# Patient Record
Sex: FEMALE | URBAN - METROPOLITAN AREA
[De-identification: names, ages, dates, MRNs, and addresses within clinical notes are randomized per-mention and may not be internally consistent; named-entity substitution may affect disease eponyms.]

---

## 2022-03-28 ENCOUNTER — OFFICE VISIT (OUTPATIENT)
Dept: URBAN - METROPOLITAN AREA CLINIC 6 | Facility: CLINIC | Age: 77
End: 2022-03-28
Payer: MEDICARE

## 2022-03-28 DIAGNOSIS — E11.9 DIABETES MELLITUS TYPE 2 WITHOUT MENTION OF COMPLICATION: Primary | ICD-10-CM

## 2022-03-28 DIAGNOSIS — H25.813 COMBINED FORMS OF AGE-RELATED CATARACT, BILATERAL: ICD-10-CM

## 2022-03-28 DIAGNOSIS — G24.5 BLEPHAROSPASM: ICD-10-CM

## 2022-03-28 DIAGNOSIS — H43.813 VITREOUS DEGENERATION, BILATERAL: ICD-10-CM

## 2022-03-28 PROCEDURE — 92004 COMPRE OPH EXAM NEW PT 1/>: CPT | Performed by: OPTOMETRIST

## 2022-03-28 ASSESSMENT — INTRAOCULAR PRESSURE
OD: 12
OS: 10

## 2022-03-28 NOTE — IMPRESSION/PLAN
Impression: Blepharospasm: G24.5. Plan: Blepharospasm vs myokymia OS. Patient educated on possible etiologies, likely d/t increased stress and decreased sleep. Educated on option of botox injections if symptoms persist. Patient expressed understanding, knows to contact clinic if this option preferred by patient in future.

## 2022-03-28 NOTE — IMPRESSION/PLAN
Impression: Diabetes mellitus Type 2 without mention of complication: M21.8. Plan: Diabetes Mellitus Type II without signs of diabetic retinopathy either eye - Discussed the pathophysiology of diabetes and its effect on the eye. Stressed the importance of strong glucose control. Advised of importance of at least annual dilated examinations, and to contact us immediately for any problems or concerns.

## 2022-10-03 ENCOUNTER — OFFICE VISIT (OUTPATIENT)
Dept: URBAN - METROPOLITAN AREA CLINIC 6 | Facility: CLINIC | Age: 77
End: 2022-10-03
Payer: MEDICARE

## 2022-10-03 DIAGNOSIS — G24.5 BLEPHAROSPASM: Primary | ICD-10-CM

## 2022-10-03 DIAGNOSIS — H04.123 TEAR FILM INSUFFICIENCY OF BILATERAL LACRIMAL GLANDS: ICD-10-CM

## 2022-10-03 PROCEDURE — 92012 INTRM OPH EXAM EST PATIENT: CPT | Performed by: OPTOMETRIST

## 2022-10-03 ASSESSMENT — INTRAOCULAR PRESSURE
OD: 11
OS: 10

## 2022-10-03 NOTE — IMPRESSION/PLAN
Impression: Blepharospasm: G24.5. Plan: Follow up visit for Blepharospasm vs myokymia OS. Patient educated on possible etiologies, likely due to increased stress and decreased sleep. Educated on option of botox injections if symptoms persist. Patient expressed understanding, knows to contact clinic if this option preferred by patient in future.

## 2022-10-03 NOTE — IMPRESSION/PLAN
Impression: Tear film insufficiency of bilateral lacrimal glands: H04.123. Plan: Dry eyes - Continue artificial tears 3-4x per day prn.